# Patient Record
Sex: FEMALE | Race: WHITE | Employment: OTHER | URBAN - NONMETROPOLITAN AREA
[De-identification: names, ages, dates, MRNs, and addresses within clinical notes are randomized per-mention and may not be internally consistent; named-entity substitution may affect disease eponyms.]

---

## 2024-09-11 ENCOUNTER — APPOINTMENT (OUTPATIENT)
Dept: RADIOLOGY | Facility: MEDICAL CENTER | Age: 74
End: 2024-09-11
Payer: COMMERCIAL

## 2024-09-11 ENCOUNTER — OFFICE VISIT (OUTPATIENT)
Dept: URGENT CARE | Facility: MEDICAL CENTER | Age: 74
End: 2024-09-11
Payer: COMMERCIAL

## 2024-09-11 VITALS
OXYGEN SATURATION: 98 % | SYSTOLIC BLOOD PRESSURE: 118 MMHG | RESPIRATION RATE: 18 BRPM | TEMPERATURE: 97.8 F | DIASTOLIC BLOOD PRESSURE: 70 MMHG | WEIGHT: 128 LBS | HEART RATE: 68 BPM

## 2024-09-11 DIAGNOSIS — G89.29 CHRONIC RIGHT HIP PAIN: ICD-10-CM

## 2024-09-11 DIAGNOSIS — M54.31 RIGHT SIDED SCIATICA: Primary | ICD-10-CM

## 2024-09-11 DIAGNOSIS — M25.551 CHRONIC RIGHT HIP PAIN: ICD-10-CM

## 2024-09-11 PROCEDURE — S9083 URGENT CARE CENTER GLOBAL: HCPCS | Performed by: ORTHOPAEDIC SURGERY

## 2024-09-11 PROCEDURE — 73502 X-RAY EXAM HIP UNI 2-3 VIEWS: CPT

## 2024-09-11 PROCEDURE — 99213 OFFICE O/P EST LOW 20 MIN: CPT | Performed by: ORTHOPAEDIC SURGERY

## 2024-09-11 RX ORDER — PREDNISONE 10 MG/1
TABLET ORAL
Qty: 18 TABLET | Refills: 0 | Status: SHIPPED | OUTPATIENT
Start: 2024-09-11

## 2024-09-11 RX ORDER — ASPIRIN 81 MG/1
TABLET, COATED ORAL
COMMUNITY

## 2024-09-11 RX ORDER — INSULIN GLARGINE 100 [IU]/ML
INJECTION, SOLUTION SUBCUTANEOUS
COMMUNITY

## 2024-09-11 RX ORDER — MULTIVITAMIN
1 CAPSULE ORAL DAILY
COMMUNITY

## 2024-09-11 RX ORDER — LISINOPRIL 5 MG/1
5 TABLET ORAL DAILY
COMMUNITY
Start: 2024-08-23

## 2024-09-11 NOTE — PROGRESS NOTES
[facility]        NAME: Anay Anne is a 73 y.o. female  : 1950    MRN: [id number]  DATE: 2024  TIME: 1:58 PM    Assessment and Plan   Right sided sciatica [M54.31]  1. Right sided sciatica  predniSONE 10 mg tablet    Ambulatory Referral to Physical Therapy    Ambulatory referral to Spine & Pain Management      2. Chronic right hip pain  XR hip/pelv 2-3 vws right if performed        XR of the right hip reviewed, no evidence of fractures or dislocations. No bony lesions. No significant right hip degenerative changes.     History and exam consistent with sciatica. Pathophysiology of sciatica discussed with the patient, as well as management options available at this time.     Patient Instructions     Pain relief:  Take over the counter motrin or naproxen as directed on bottle  These are non-steroidal anti-inflammatories (NSAIDs). Ask your primary care provider before you take NSAIDs if you are on any blood thinners, or if you have a history of heart disease, kidney disease, gastric bypass surgery, GI bleed, or poorly controlled high blood pressure.    Acetaminophen (Tylenol) 1,000mg every 6-8 hours   Do not take more than 4,000mg of Tylenol a day  Over-the-counter lidocaine patches, Icyhot patches  Bengay   Compressive braces/wraps  Alternating cool compresses and heating pad  If prescribed, take prednisone as directed  Steroids are indicated for nerve inflammation (radiculopathy)  If prescribed, take Robaxin as directed  Robaxin is a muscle relaxer, indicated for muscle strains  Do not drive or operate heavy machinery while taken Robaxin as it may make you drowsy  Gentle home exercises/stretches  Script for PT provided  Follow up with chiropractic or Spine and Pain Management if your symptoms do not improve  Proceed to the ED if symptoms worsen    If tests are performed, our office will contact you with results only if changes need to made to the care plan discussed with you at  "the visit. You can review your full results on [facility].    Chief Complaint     Chief Complaint   Patient presents with    Hip Pain     Dev right hip pain  past 1 month . Had primary Dr german today, was sent   to  urgent Care to have x ray done.         History of Present Illness       73 YOF presents to the urgent care for evaluation of right leg pain. The patient is predominantly Sami speaking, though her daughter is in the room with her available for translation. The patient states she has been experiencing this pain for over a month. First she notes the pain starts anteriorly, radiating down the entire leg to the toes, though upon further questioning during exam she then admits the pain is along the buttock and posterior aspect of the leg radiating down. The pain is described as \"tight\" without numbness or tingling. She denies any injury or trauma. The pain is constant, but worsens with standing, lying down. She notes she has had similar pain in the past, though it has always resolved on its own without the need for medical intervention. The patient denies any back pain, denies any fevers or chills.         Review of Systems   Review of Systems   Constitutional:  Negative for chills and fever.   HENT:  Negative for ear pain and sore throat.    Eyes:  Negative for pain and visual disturbance.   Respiratory:  Negative for cough and shortness of breath.    Cardiovascular:  Negative for chest pain and palpitations.   Gastrointestinal:  Negative for abdominal pain and vomiting.   Genitourinary:  Negative for dysuria and hematuria.   Musculoskeletal:  Positive for arthralgias, gait problem and myalgias. Negative for back pain.   Skin:  Negative for color change and rash.   Neurological:  Negative for seizures and syncope.   All other systems reviewed and are negative.        Current Medications       Current Outpatient Medications:     Aspirin Low Dose 81 MG EC tablet, TOME 1 TABLETA POR VIA ORAL TODOS LOS " AHUJA FOR 90 DAYS, Disp: , Rfl:     Lantus SoloStar 100 units/mL SOPN, INJECT 12 UNITS ONCE A DAY, Disp: , Rfl:     lisinopril (ZESTRIL) 5 mg tablet, Take 5 mg by mouth daily, Disp: , Rfl:     metFORMIN (GLUCOPHAGE) 1000 MG tablet, Take 1,000 mg by mouth daily with breakfast, Disp: , Rfl:     Multiple Vitamin (multivitamin) capsule, Take 1 capsule by mouth daily, Disp: , Rfl:     predniSONE 10 mg tablet, 4 x 3 days, 3 x 1, 2 x 1, 1 x 1, Disp: 18 tablet, Rfl: 0    Current Allergies     Allergies as of 2024    (No Known Allergies)            The following portions of the patient's history were reviewed and updated as appropriate: allergies, current medications, past family history, past medical history, past social history, past surgical history and problem list.     Past Medical History:   Diagnosis Date    Diabetes mellitus (HCC)        Past Surgical History:   Procedure Laterality Date     SECTION         History reviewed. No pertinent family history.      Medications have been verified.        Objective   /70 (BP Location: Right arm, Patient Position: Sitting, Cuff Size: Standard)   Pulse 68   Temp 97.8 °F (36.6 °C) (Temporal)   Resp 18   Wt 58.1 kg (128 lb)   SpO2 98%        Physical Exam     Physical Exam  Vitals and nursing note reviewed.   Constitutional:       General: She is not in acute distress.     Appearance: Normal appearance. She is not ill-appearing.   HENT:      Head: Normocephalic and atraumatic.      Nose: Nose normal.      Mouth/Throat:      Mouth: Mucous membranes are moist.      Pharynx: Oropharynx is clear.   Eyes:      Extraocular Movements: Extraocular movements intact.      Pupils: Pupils are equal, round, and reactive to light.   Cardiovascular:      Rate and Rhythm: Normal rate and regular rhythm.      Pulses: Normal pulses.   Pulmonary:      Effort: Pulmonary effort is normal. No respiratory distress.   Musculoskeletal:      Cervical back: Normal range of motion.       Comments: The patient is able to stand from a seated position slowly, with pain. She ambulates on her own without assistive device, though with a painful limp, as weight bearing on the RLE causes pain. There is tenderness along the right buttock, sciatic notch. She also notes palpable tenderness along the right paraspinal musculature. No tenderness anteriorly or at the hip joint/GTB. The patient has full AROM of the ankle, knee, and hip. She is able to perform a SLR on the RLE, though does complain of pain in the right lower back and expresses difficulty performing a SLR on the LLE. 5/5 strength resisted knee flexion/extension, sitting hip flexion/abduction/adduction bilaterally. The lower extremities are neurovascularly intact.    Skin:     General: Skin is warm and dry.      Capillary Refill: Capillary refill takes less than 2 seconds.   Neurological:      General: No focal deficit present.      Mental Status: She is alert and oriented to person, place, and time.   Psychiatric:         Mood and Affect: Mood normal.         Behavior: Behavior normal.

## 2024-09-11 NOTE — PATIENT INSTRUCTIONS
Pain relief:  Take over the counter motrin or naproxen as directed on bottle  These are non-steroidal anti-inflammatories (NSAIDs). Ask your primary care provider before you take NSAIDs if you are on any blood thinners, or if you have a history of heart disease, kidney disease, gastric bypass surgery, GI bleed, or poorly controlled high blood pressure.    Acetaminophen (Tylenol) 1,000mg every 6-8 hours   Do not take more than 4,000mg of Tylenol a day  Over-the-counter lidocaine patches, Icyhot patches  Bengay   Compressive braces/wraps  Alternating cool compresses and heating pad  If prescribed, take prednisone as directed  Steroids are indicated for nerve inflammation (radiculopathy)  If prescribed, take Robaxin as directed  Robaxin is a muscle relaxer, indicated for muscle strains  Do not drive or operate heavy machinery while taken Robaxin as it may make you drowsy  Gentle home exercises/stretches  Script for PT provided  Follow up with chiropractic or Spine and Pain Management if your symptoms do not improve  Proceed to the ED if symptoms worsen

## 2024-09-23 ENCOUNTER — EVALUATION (OUTPATIENT)
Dept: PHYSICAL THERAPY | Facility: CLINIC | Age: 74
End: 2024-09-23
Payer: COMMERCIAL

## 2024-09-23 DIAGNOSIS — M25.551 RIGHT HIP PAIN: ICD-10-CM

## 2024-09-23 DIAGNOSIS — M54.32 BILATERAL SCIATICA: Primary | ICD-10-CM

## 2024-09-23 DIAGNOSIS — M54.31 BILATERAL SCIATICA: Primary | ICD-10-CM

## 2024-09-23 PROCEDURE — 97140 MANUAL THERAPY 1/> REGIONS: CPT | Performed by: PHYSICAL THERAPIST

## 2024-09-23 PROCEDURE — 97110 THERAPEUTIC EXERCISES: CPT | Performed by: PHYSICAL THERAPIST

## 2024-09-23 PROCEDURE — 97161 PT EVAL LOW COMPLEX 20 MIN: CPT | Performed by: PHYSICAL THERAPIST

## 2024-09-23 NOTE — LETTER
2024    Cullen Gonzalez MD  [address] [facility] [address]    Patient: Anay Anne   YOB: 1950   Date of Visit: 2024     Encounter Diagnosis     ICD-10-CM    1. Bilateral sciatica  M54.31     M54.32       2. Right hip pain  M25.551           Dear Dr. Gonzalez:    Thank you for your recent referral of Anay Anne. Please review the attached evaluation summary from Anay's recent visit.     Please verify that you agree with the plan of care by signing the attached order.     If you have any questions or concerns, please do not hesitate to call.     I sincerely appreciate the opportunity to share in the care of one of your patients and hope to have another opportunity to work with you in the near future.       Sincerely,    Williams Darling, PT      Referring Provider:      I certify that I have read the below Plan of Care and certify the need for these services furnished under this plan of treatment while under my care.                    Cullen Gonzalez MD  [address] [facility] [address]  Via Fax: [phone]          PT Evaluation     Today's date: 2024  Patient name: Anay Anne  : 1950  MRN: [id number]  Referring provider: Cullen Gonzalez MD  Dx:   Encounter Diagnosis     ICD-10-CM    1. Bilateral sciatica  M54.31     M54.32       2. Right hip pain  M25.551           Start Time: 0930  Stop Time: 1015  Total time in clinic (min): 45 minutes    Assessment  Impairments: abnormal gait, abnormal or restricted ROM, activity intolerance, impaired balance, impaired physical strength, lacks appropriate home exercise program, pain with function and activity limitations  Symptom irritability: moderate    Assessment details: Patient is a 72 y/o female with chief c/o R sided low back and R LE pain. Patient is here with her daughter who assisted in translation as necessary per patient's preference. Patient is presenting with R sided radicular symptoms with positive slump  "and crossed SLR tests. She has well preserved LE strength during resistance testing of LE myotomes. She noted no decrease in sensation to light touch to the B/L LE dermatomes. She denies any saddle anesthesia nor changes to bowel or bladder habits per her daughter. There was positive response to manual traction in a hook lying position with reports of decreased muscle spasm and radicular symptoms. She felt overall decreased pain post therapy interventions today and was instructed in proper posture and supported sitting along with activity modification to decrease time spent lying in bed. Patient was provided with a copy of her HEP with verbal understanding noted.   Understanding of Dx/Px/POC: good     Prognosis: good    Goals  STGs:  \"Patient will be independent with hep by 2-3 visits.   Decrease low back pain by 25% for improved tolerance with adls and home duties by 3-4 weeks.   Improve Lumbar rom to wfl for improved tolerance with adls and home duties by 3-4 weeks. \"    LTGs:  \"Improve FOTO score from 12 to 49 indicating improved tolerance with activities involving the low back by discharge.   Patient will demonstrate rom and strength to the lumbar spine wfl for improved tolerance with adls and home duties by discharge.   Patient will be free of radicular symptoms by discharge. \"      Plan  Patient would benefit from: skilled physical therapy  Planned modality interventions: cryotherapy and thermotherapy: hydrocollator packs    Planned therapy interventions: abdominal trunk stabilization, ADL retraining, body mechanics training, flexibility, functional ROM exercises, home exercise program, therapeutic exercise, therapeutic activities, stretching, strengthening, postural training, patient education, joint mobilization and manual therapy    Frequency: 2x week  Duration in weeks: 6  Plan of Care beginning date: 9/23/2024  Plan of Care expiration date: 11/4/2024  Treatment plan discussed with: patient  Plan details: " Patient informed that from this point forward, to ensure adherence to the aforementioned plan of care, all or some of the treatment may be performed and carried out by a Physical Therapy Assistant (PTA) with supervision from a licensed Physical Therapist (PT) in accordance with [facility] Physical Therapy Practice Act.  Patient will continue to benefit from skilled physical therapy to address the functional deficits that were identified during the evaluation today. We will continue to progress the therapy program to address these functional deficits and achieve the established goals.                Subjective Evaluation    History of Present Illness  Mechanism of injury: Patient presents to out patient physical therapy with chief c/o low back pain. Patient reports a history of insidious onset of symptoms over the past 3 months.           Not a recurrent problem   Quality of life: good    Patient Goals  Patient goals for therapy: decreased pain, increased motion, increased strength, independence with ADLs/IADLs and return to sport/leisure activities    Pain  Current pain ratin  At best pain ratin  At worst pain rating: 10  Location: Lumbar  Relieving factors: medications (R knee to chest while lying down)  Aggravating factors: sitting, lifting, walking and standing    Social Support  Lives in: multiple-level home  Lives with: adult children    Employment status: not working        Objective     Tenderness     Lumbar Spine  No tenderness in the spinous process, facet joint, left transverse process and right transverse process.     Left Hip   Tenderness in the PSIS.     Right Hip   Tenderness in the PSIS.     Neurological Testing     Sensation     Lumbar   Left   Intact: light touch    Right   Intact: light touch    Reflexes   Left   Patellar (L4): normal (2+)  Achilles (S1): normal (2+)  Clonus sign: negative    Right   Patellar (L4): normal (2+)  Achilles (S1): normal (2+)  Clonus sign:  "negative    Active Range of Motion     Lumbar   Flexion:  with pain Restriction level: minimal  Extension:  with pain Restriction level: moderate  Left lateral flexion:  with pain Restriction level: moderate  Right lateral flexion:  with pain Restriction level: moderate  Left rotation:  with pain Restriction level: minimal  Right rotation:  with pain Restriction level: minimal    Strength/Myotome Testing     Left Hip   Planes of Motion   Flexion: 4    Right Hip   Planes of Motion   Flexion: 4    Left Knee   Flexion: 4  Extension: 4    Right Knee   Flexion: 4  Extension: 4    Left Ankle/Foot   Dorsiflexion: 4  Plantar flexion: 4  Great toe extension: 4    Right Ankle/Foot   Dorsiflexion: 4  Plantar flexion: 4  Great toe extension: 4    Tests     Lumbar     Left   Positive crossed SLR.   Negative slump test.     Right   Positive slump test.   Negative crossed SLR.     Ambulation     Observational Gait   Gait: antalgic and crouched   Increased left stance time. Decreased right stance time.       Flowsheet Rows      Flowsheet Row Most Recent Value   PT/OT G-Codes    Current Score 12   Projected Score 49               Precautions: None      Date 9/23 IE       FOTO 12 SC       Manuals        Hook lying manual lumbar traction 10 min                               Neuro Re-Ed                                                                Ther Ex        LTR 5\" 10x       Posture education/transfer training 10 min                                                       Ther Activity                        Gait Training                        Modalities                                               "

## 2024-09-23 NOTE — PROGRESS NOTES
"PT Evaluation     Today's date: 2024  Patient name: Anay Anne  : 1950  MRN: [id number]  Referring provider: Cullen Gonzalez MD  Dx:   Encounter Diagnosis     ICD-10-CM    1. Bilateral sciatica  M54.31     M54.32       2. Right hip pain  M25.551           Start Time: 0930  Stop Time: 1015  Total time in clinic (min): 45 minutes    Assessment  Impairments: abnormal gait, abnormal or restricted ROM, activity intolerance, impaired balance, impaired physical strength, lacks appropriate home exercise program, pain with function and activity limitations  Symptom irritability: moderate    Assessment details: Patient is a 72 y/o female with chief c/o R sided low back and R LE pain. Patient is here with her daughter who assisted in translation as necessary per patient's preference. Patient is presenting with R sided radicular symptoms with positive slump and crossed SLR tests. She has well preserved LE strength during resistance testing of LE myotomes. She noted no decrease in sensation to light touch to the B/L LE dermatomes. She denies any saddle anesthesia nor changes to bowel or bladder habits per her daughter. There was positive response to manual traction in a hook lying position with reports of decreased muscle spasm and radicular symptoms. She felt overall decreased pain post therapy interventions today and was instructed in proper posture and supported sitting along with activity modification to decrease time spent lying in bed. Patient was provided with a copy of her HEP with verbal understanding noted.   Understanding of Dx/Px/POC: good     Prognosis: good    Goals  STGs:  \"Patient will be independent with hep by 2-3 visits.   Decrease low back pain by 25% for improved tolerance with adls and home duties by 3-4 weeks.   Improve Lumbar rom to wfl for improved tolerance with adls and home duties by 3-4 weeks. \"    LTGs:  \"Improve FOTO score from 12 to 49 indicating improved tolerance with activities " "involving the low back by discharge.   Patient will demonstrate rom and strength to the lumbar spine wfl for improved tolerance with adls and home duties by discharge.   Patient will be free of radicular symptoms by discharge. \"      Plan  Patient would benefit from: skilled physical therapy  Planned modality interventions: cryotherapy and thermotherapy: hydrocollator packs    Planned therapy interventions: abdominal trunk stabilization, ADL retraining, body mechanics training, flexibility, functional ROM exercises, home exercise program, therapeutic exercise, therapeutic activities, stretching, strengthening, postural training, patient education, joint mobilization and manual therapy    Frequency: 2x week  Duration in weeks: 6  Plan of Care beginning date: 9/23/2024  Plan of Care expiration date: 11/4/2024  Treatment plan discussed with: patient  Plan details: Patient informed that from this point forward, to ensure adherence to the aforementioned plan of care, all or some of the treatment may be performed and carried out by a Physical Therapy Assistant (PTA) with supervision from a licensed Physical Therapist (PT) in accordance with [facility] Physical Therapy Practice Act.  Patient will continue to benefit from skilled physical therapy to address the functional deficits that were identified during the evaluation today. We will continue to progress the therapy program to address these functional deficits and achieve the established goals.                Subjective Evaluation    History of Present Illness  Mechanism of injury: Patient presents to out patient physical therapy with chief c/o low back pain. Patient reports a history of insidious onset of symptoms over the past 3 months.           Not a recurrent problem   Quality of life: good    Patient Goals  Patient goals for therapy: decreased pain, increased motion, increased strength, independence with ADLs/IADLs and return to sport/leisure " activities    Pain  Current pain ratin  At best pain ratin  At worst pain rating: 10  Location: Lumbar  Relieving factors: medications (R knee to chest while lying down)  Aggravating factors: sitting, lifting, walking and standing    Social Support  Lives in: multiple-level home  Lives with: adult children    Employment status: not working        Objective     Tenderness     Lumbar Spine  No tenderness in the spinous process, facet joint, left transverse process and right transverse process.     Left Hip   Tenderness in the PSIS.     Right Hip   Tenderness in the PSIS.     Neurological Testing     Sensation     Lumbar   Left   Intact: light touch    Right   Intact: light touch    Reflexes   Left   Patellar (L4): normal (2+)  Achilles (S1): normal (2+)  Clonus sign: negative    Right   Patellar (L4): normal (2+)  Achilles (S1): normal (2+)  Clonus sign: negative    Active Range of Motion     Lumbar   Flexion:  with pain Restriction level: minimal  Extension:  with pain Restriction level: moderate  Left lateral flexion:  with pain Restriction level: moderate  Right lateral flexion:  with pain Restriction level: moderate  Left rotation:  with pain Restriction level: minimal  Right rotation:  with pain Restriction level: minimal    Strength/Myotome Testing     Left Hip   Planes of Motion   Flexion: 4    Right Hip   Planes of Motion   Flexion: 4    Left Knee   Flexion: 4  Extension: 4    Right Knee   Flexion: 4  Extension: 4    Left Ankle/Foot   Dorsiflexion: 4  Plantar flexion: 4  Great toe extension: 4    Right Ankle/Foot   Dorsiflexion: 4  Plantar flexion: 4  Great toe extension: 4    Tests     Lumbar     Left   Positive crossed SLR.   Negative slump test.     Right   Positive slump test.   Negative crossed SLR.     Ambulation     Observational Gait   Gait: antalgic and crouched   Increased left stance time. Decreased right stance time.       Flowsheet Rows      Flowsheet Row Most Recent Value   PT/OT G-Codes   "  Current Score 12   Projected Score 49               Precautions: None      Date 9/23 IE       FOTO 12 SC       Manuals        Hook lying manual lumbar traction 10 min                               Neuro Re-Ed                                                                Ther Ex        LTR 5\" 10x       Posture education/transfer training 10 min                                                       Ther Activity                        Gait Training                        Modalities                               "

## 2024-09-24 ENCOUNTER — OFFICE VISIT (OUTPATIENT)
Dept: PHYSICAL THERAPY | Facility: CLINIC | Age: 74
End: 2024-09-24
Payer: COMMERCIAL

## 2024-09-24 DIAGNOSIS — M25.551 RIGHT HIP PAIN: ICD-10-CM

## 2024-09-24 DIAGNOSIS — M54.31 BILATERAL SCIATICA: Primary | ICD-10-CM

## 2024-09-24 DIAGNOSIS — M54.32 BILATERAL SCIATICA: Primary | ICD-10-CM

## 2024-09-24 PROCEDURE — 97110 THERAPEUTIC EXERCISES: CPT | Performed by: PHYSICAL THERAPIST

## 2024-09-24 PROCEDURE — 97530 THERAPEUTIC ACTIVITIES: CPT | Performed by: PHYSICAL THERAPIST

## 2024-09-24 NOTE — PROGRESS NOTES
"Daily Note     Today's date: 2024  Patient name: Anay Anne  : 1950  MRN: [id number]  Referring provider: Cullen Gonzalez MD  Dx:   Encounter Diagnosis     ICD-10-CM    1. Bilateral sciatica  M54.31     M54.32       2. Right hip pain  M25.551           Start Time: 0930  Stop Time: 1024  Total time in clinic (min): 54 minutes    Subjective: Patient reports slight improvements with symptoms into the R LE since her initial visit. She continues to report difficulties with prolonged sit to stand and notes that symptoms will travel to the R foot but are not constantly there. She finds relief by lying down.       Objective: See treatment diary below      Assessment: Tolerated treatment well. Patient demonstrated fatigue post treatment, exhibited good technique with therapeutic exercises, and would benefit from continued PT Patient responded well to sustained prone extension with centralization of symptoms to the thigh. There was increased discomfort to the R LE with bridges and she also noted increased discomfort during prone press ups which were held due to increased pain. Patient educated on prone propping for HEP today.       Plan: Continue per plan of care.  Progress treatment as tolerated.       Precautions: None      Date  IE       FOTO 12 SC       Manuals        Hook lying manual lumbar traction 10 min NT                              Neuro Re-Ed                                                                Ther Ex        LTR 5\" 10x 5\" 10x      Posture education/transfer training 10 min       Prone lying  3' x2 no pillow 3' x1 1 pillow      Prone press up  15x Hold P!      NuStep for mobility and strengthening  L6 5 min                              Ther Activity        Bridges  5\" 15x      PPT  5\" 15x      Gait Training                        Modalities                               "

## 2024-09-30 ENCOUNTER — OFFICE VISIT (OUTPATIENT)
Dept: PHYSICAL THERAPY | Facility: CLINIC | Age: 74
End: 2024-09-30
Payer: COMMERCIAL

## 2024-09-30 DIAGNOSIS — M25.551 RIGHT HIP PAIN: ICD-10-CM

## 2024-09-30 DIAGNOSIS — M54.32 BILATERAL SCIATICA: Primary | ICD-10-CM

## 2024-09-30 DIAGNOSIS — M54.31 BILATERAL SCIATICA: Primary | ICD-10-CM

## 2024-09-30 PROCEDURE — 97530 THERAPEUTIC ACTIVITIES: CPT | Performed by: PHYSICAL THERAPIST

## 2024-09-30 PROCEDURE — 97140 MANUAL THERAPY 1/> REGIONS: CPT | Performed by: PHYSICAL THERAPIST

## 2024-09-30 PROCEDURE — 97110 THERAPEUTIC EXERCISES: CPT | Performed by: PHYSICAL THERAPIST

## 2024-09-30 NOTE — PROGRESS NOTES
"Daily Note     Today's date: 2024  Patient name: Anay Anne  : 1950  MRN: [id number]  Referring provider: Cullen Gonzalez MD  Dx:   Encounter Diagnosis     ICD-10-CM    1. Bilateral sciatica  M54.31     M54.32       2. Right hip pain  M25.551           Start Time: 1145  Stop Time: 1230  Total time in clinic (min): 45 minutes    Subjective: Patient reports increased R LE pain since the end of last week. She noted mild improvements with her HEP but noted continuation of symptoms throughout the day over the weekend. She reports difficulties with sleep secondary to increased pain to the R LE and hip.       Objective: See treatment diary below      Assessment: Tolerated treatment fair. Patient  Patient to contact her physician regarding continued elevated pain levels.   Patient reported a decrease in pain during hook lying manual traction but upon standing and walking noted a return of symptoms and increased R LE pain. Patient had better tolerance for mobility of the lumbar spine in supine compared to standing.       Plan: Progress treatment as tolerated.       Precautions: None      Date  IE      FOTO 12 SC       Manuals        Hook lying manual lumbar traction 10 min NT 10 min                             Neuro Re-Ed                                                                Ther Ex        LTR 5\" 10x 5\" 10x 5\" 15x     Posture education/transfer training 10 min       Prone lying  3' x2 no pillow 3' x1 1 pillow NT     Prone press up  15x Hold P! NT     NuStep for mobility and strengthening  L6 5 min L4 7 min     SKTC   5\" 10x ea.                      Ther Activity        Bridges  5\" 15x NT     PPT  5\" 15x 5\" 15x     Gait Training                        Modalities                                 "

## 2024-10-03 RX ORDER — BLOOD SUGAR DIAGNOSTIC
1 STRIP MISCELLANEOUS DAILY
COMMUNITY
Start: 2024-09-04

## 2024-10-03 RX ORDER — TERBINAFINE HYDROCHLORIDE 250 MG/1
250 TABLET ORAL
COMMUNITY
End: 2024-10-07

## 2024-10-03 RX ORDER — GLIPIZIDE 10 MG/1
TABLET, FILM COATED, EXTENDED RELEASE ORAL
COMMUNITY
End: 2024-10-07

## 2024-10-03 RX ORDER — BLOOD-GLUCOSE METER
KIT MISCELLANEOUS
COMMUNITY

## 2024-10-03 RX ORDER — LANCETS 33 GAUGE
EACH MISCELLANEOUS
COMMUNITY

## 2024-10-03 RX ORDER — ICOSAPENT ETHYL 0.5 G/1
CAPSULE ORAL
COMMUNITY
End: 2024-10-07

## 2024-10-03 RX ORDER — ATORVASTATIN CALCIUM 20 MG/1
20 TABLET, FILM COATED ORAL DAILY
COMMUNITY
Start: 2024-08-23

## 2024-10-03 RX ORDER — LINAGLIPTIN 5 MG/1
5 TABLET, FILM COATED ORAL
COMMUNITY
End: 2024-10-07

## 2024-10-03 RX ORDER — FLURBIPROFEN SODIUM 0.3 MG/ML
SOLUTION/ DROPS OPHTHALMIC DAILY
COMMUNITY
Start: 2024-09-04

## 2024-10-07 ENCOUNTER — APPOINTMENT (OUTPATIENT)
Dept: RADIOLOGY | Facility: MEDICAL CENTER | Age: 74
End: 2024-10-07
Payer: COMMERCIAL

## 2024-10-07 ENCOUNTER — CONSULT (OUTPATIENT)
Dept: PAIN MEDICINE | Facility: CLINIC | Age: 74
End: 2024-10-07
Payer: COMMERCIAL

## 2024-10-07 VITALS
WEIGHT: 129 LBS | RESPIRATION RATE: 16 BRPM | SYSTOLIC BLOOD PRESSURE: 151 MMHG | BODY MASS INDEX: 23.74 KG/M2 | OXYGEN SATURATION: 97 % | TEMPERATURE: 97.7 F | DIASTOLIC BLOOD PRESSURE: 70 MMHG | HEIGHT: 62 IN | HEART RATE: 70 BPM

## 2024-10-07 DIAGNOSIS — M51.16 LUMBAR DISC DISEASE WITH RADICULOPATHY: Primary | ICD-10-CM

## 2024-10-07 DIAGNOSIS — R29.898 RIGHT LEG WEAKNESS: ICD-10-CM

## 2024-10-07 DIAGNOSIS — M54.31 RIGHT SIDED SCIATICA: ICD-10-CM

## 2024-10-07 DIAGNOSIS — G89.4 CHRONIC PAIN SYNDROME: ICD-10-CM

## 2024-10-07 DIAGNOSIS — M51.16 LUMBAR DISC DISEASE WITH RADICULOPATHY: ICD-10-CM

## 2024-10-07 PROCEDURE — 72114 X-RAY EXAM L-S SPINE BENDING: CPT

## 2024-10-07 PROCEDURE — 99204 OFFICE O/P NEW MOD 45 MIN: CPT | Performed by: ANESTHESIOLOGY

## 2024-10-07 PROCEDURE — G2211 COMPLEX E/M VISIT ADD ON: HCPCS | Performed by: ANESTHESIOLOGY

## 2024-10-07 RX ORDER — DULOXETIN HYDROCHLORIDE 20 MG/1
20 CAPSULE, DELAYED RELEASE ORAL DAILY
Qty: 30 CAPSULE | Refills: 1 | Status: SHIPPED | OUTPATIENT
Start: 2024-10-07 | End: 2024-11-06

## 2024-10-07 NOTE — PROGRESS NOTES
Assessment:  1. Lumbar disc disease with radiculopathy    2. Right sided sciatica    3. Right leg weakness    4. Chronic pain syndrome        Plan:  Patient is a 73-year-old female with complaints of low back pain and severe right leg pain with chronic pain some secondary to lumbar radiculopathy presents to office for initial consultation.  Patient reported severe right leg pain and weakness.  Patient reports specially at nighttime and in the morning she has excruciating sharp, shooting pains and pins-and-needles in the right leg which appear to be in the L4 and L5 nerve root distribution.  Patient has done physical therapy without any significant alleviation symptoms.  At today's visit there is single surgeon for the weakness in patient's right leg.  1.  We will obtain an x-ray of the lumbar spine to better assess the degenerative change and correlate patient current presentation.  2.  We will order a MRI of the lumbar spine to better assess the discogenic pathology by patient's right lower extremity Dickler symptoms  With 3 we will trial Cymbalta 30 mg p.o. nightly  4.  Follow-up in 3 weeks to review MRI and plan interventional management    History of Present Illness:    The patient is a 73 y.o. female who presents for consultation in regards to Back Pain.  Symptoms have been present for 3 months. Symptoms began without any precipitating injury or trauma. Pain is reported to be 10 on the numeric rating scale.  Symptoms are felt constantly and worst in the morning.  Symptoms are characterized as shooting, dull/aching, numbing, tingling, and throbbing.  Symptoms are associated with right leg weakness.  Aggravating factors include standing, bending, leaning forward, leaning bckward, sitting, walking, and coughing/sneezing.  Relieving factors include lying down, turning the head, and relaxation.  No change in symptoms with kneeling, coughing/sneezing, and bowel movements.  Treatments that have been helpful include  "nothing. physical therapy, home exercise, and heat/ice have provided no relief.  Medications to relieve symptoms include ibuprofen.    Review of Systems:    Review of Systems   Musculoskeletal:  Positive for back pain and gait problem.   All other systems reviewed and are negative.          Past Medical History:   Diagnosis Date    Diabetes mellitus (HCC)        Past Surgical History:   Procedure Laterality Date     SECTION         History reviewed. No pertinent family history.    Social History     Occupational History    Not on file   Tobacco Use    Smoking status: Never     Passive exposure: Never    Smokeless tobacco: Never   Vaping Use    Vaping status: Never Used   Substance and Sexual Activity    Alcohol use: Never    Drug use: Never    Sexual activity: Not Currently         Current Outpatient Medications:     Aspirin Low Dose 81 MG EC tablet, TOME 1 TABLETA POR VIA ORAL TODOS LOS AHUJA FOR 90 DAYS, Disp: , Rfl:     atorvastatin (LIPITOR) 20 mg tablet, Take 20 mg by mouth daily, Disp: , Rfl:     B-D UF III MINI PEN NEEDLES 31G X 5 MM MISC, daily, Disp: , Rfl:     Blood Glucose Monitoring Suppl (Assure Pro Blood Glucose Meter) MICHAEL, OneTouch Ultra2 Meter, Disp: , Rfl:     Insulin Syringe-Needle U-100 25G X 1\" 1 ML MISC, BD Insulin Syringe Ultra-Fine 1 mL 31 gauge x 5/16\"  USE TO INJECT INSULIN ONCE DAILY AS DIRECTED, Disp: , Rfl:     Lancets 33G MISC, OneTouch Delica Plus Lancet 33 gauge  USE THREE TIMES A DAY TO CHECK SUGARS, Disp: , Rfl:     Lantus SoloStar 100 units/mL SOPN, INJECT 12 UNITS ONCE A DAY, Disp: , Rfl:     lisinopril (ZESTRIL) 5 mg tablet, Take 5 mg by mouth daily, Disp: , Rfl:     metFORMIN (GLUCOPHAGE) 1000 MG tablet, Take 1,000 mg by mouth daily with breakfast, Disp: , Rfl:     Multiple Vitamin (multivitamin) capsule, Take 1 capsule by mouth daily, Disp: , Rfl:     OneTouch Ultra test strip, Use 1 each daily Test, Disp: , Rfl:     predniSONE 10 mg tablet, 4 x 3 days, 3 x 1, 2 x 1, 1 x " "1, Disp: 18 tablet, Rfl: 0    No Known Allergies    Physical Exam:    /70   Pulse 70   Temp 97.7 °F (36.5 °C)   Resp 16   Ht 5' 2\" (1.575 m)   Wt 58.5 kg (129 lb)   SpO2 97%   BMI 23.59 kg/m²     Constitutional: normal, well developed, well nourished, alert, in no distress and non-toxic and no overt pain behavior.  Eyes: anicteric  HEENT: grossly intact  Neck: supple, symmetric, trachea midline and no masses   Pulmonary:even and unlabored  Cardiovascular:No edema or pitting edema present  Skin:Normal without rashes or lesions and well hydrated  Psychiatric:Mood and affect appropriate  Neurologic:Cranial Nerves II-XII grossly intact  Musculoskeletal:antalgic    Lumbar/Sacral Spine examination demonstrates.  Full range of motion lumbar spine with pain upon: flexion, lateral rotation to the left/right, and bending to the left/right.  Bilateral lumbar paraspinals tender to palpation. Muscle spasms noted in the lumbar area bilaterally. 3/5 right lower extremity strength in all muscle groups bilaterally. Positive seated straight leg raise for bilateral lower extremities.  Sensitivity to light touch intact bilateral lower extremities. 2+ reflexes in the patella and Achilles.  No ankle clonus    Imaging            No orders to display       No orders of the defined types were placed in this encounter.     "

## 2024-10-09 ENCOUNTER — OFFICE VISIT (OUTPATIENT)
Dept: OBGYN CLINIC | Facility: CLINIC | Age: 74
End: 2024-10-09
Payer: COMMERCIAL

## 2024-10-09 VITALS
TEMPERATURE: 98 F | DIASTOLIC BLOOD PRESSURE: 79 MMHG | OXYGEN SATURATION: 97 % | HEART RATE: 71 BPM | SYSTOLIC BLOOD PRESSURE: 136 MMHG | HEIGHT: 62 IN | RESPIRATION RATE: 16 BRPM | WEIGHT: 126.6 LBS | BODY MASS INDEX: 23.3 KG/M2

## 2024-10-09 DIAGNOSIS — M54.31 RIGHT SIDED SCIATICA: Primary | ICD-10-CM

## 2024-10-09 DIAGNOSIS — M25.551 PAIN IN RIGHT HIP: ICD-10-CM

## 2024-10-09 PROCEDURE — 99203 OFFICE O/P NEW LOW 30 MIN: CPT | Performed by: STUDENT IN AN ORGANIZED HEALTH CARE EDUCATION/TRAINING PROGRAM

## 2024-10-09 NOTE — PROGRESS NOTES
"Assessment & Plan  Pain in right hip  Discussed that pain is likely source from lumbar spine  Recommend continued follow up with spine and pain  Patient is to follow up as needed  Discussed that can use heat to lower back as needed         Right sided sciatica             Chief Complaint   Patient presents with    Right Leg - Pain        Subjective    Anay Anne is a 73 y.o. female who presents with right hip pain:         History of Present Illness   Hip pain started 3 months ago.  The pain is 7/10. The pain is located in the lower back and shoots down her glutes to her toes. The pain is described as sharp shooting pain. They have tried NSAIDS for their problem without relief.Pain is constant. Pain worsens with sitting for long periods of time, standing, walking, she notes that can't get comfortable and is not able to sleep well at night.    The pain does shoot down into the foot. The patient does not have numbness and/or tingling in the foot. The hip does not  pop. The patient does not  have pain with coughing or sneezing. The patient does not  have bowel or bladder problems.    [facility] Patient Answers  Failed to redirect to the Timeline version of the [employer] SmartLink.    No Known Allergies  Outpatient Encounter Medications as of 10/9/2024   Medication Sig Dispense Refill    Aspirin Low Dose 81 MG EC tablet TOME 1 TABLETA POR VIA ORAL TODOS LOS AHUJA FOR 90 DAYS      atorvastatin (LIPITOR) 20 mg tablet Take 20 mg by mouth daily      B-D UF III MINI PEN NEEDLES 31G X 5 MM MISC daily      Blood Glucose Monitoring Suppl (Assure Pro Blood Glucose Meter) MICHAEL OneTouch Ultra2 Meter      DULoxetine (CYMBALTA) 20 mg capsule Take 1 capsule (20 mg total) by mouth daily 30 capsule 1    ferrous fumarate-b12-vitamic C-folic acid (FOLTRIN) capsule Take 1 capsule by mouth daily before breakfast      Insulin Syringe-Needle U-100 25G X 1\" 1 ML MISC BD Insulin Syringe Ultra-Fine 1 mL 31 gauge x 5/16\"   USE TO INJECT " INSULIN ONCE DAILY AS DIRECTED      Lancets 33G MISC OneTouch Delica Plus Lancet 33 gauge   USE THREE TIMES A DAY TO CHECK SUGARS      Lantus SoloStar 100 units/mL SOPN INJECT 12 UNITS ONCE A DAY      lisinopril (ZESTRIL) 5 mg tablet Take 5 mg by mouth daily      metFORMIN (GLUCOPHAGE) 1000 MG tablet Take 1,000 mg by mouth daily with breakfast      Multiple Vitamin (multivitamin) capsule Take 1 capsule by mouth daily      OneTouch Ultra test strip Use 1 each daily Test      predniSONE 10 mg tablet 4 x 3 days, 3 x 1, 2 x 1, 1 x 1 (Patient not taking: Reported on 10/7/2024) 18 tablet 0     No facility-administered encounter medications on file as of 10/9/2024.     Past Medical History:   Diagnosis Date    Diabetes mellitus (HCC)        Past Surgical History:   Procedure Laterality Date     SECTION       History reviewed. No pertinent family history.    Social History     Tobacco Use    Smoking status: Never     Passive exposure: Never    Smokeless tobacco: Never   Vaping Use    Vaping status: Never Used   Substance Use Topics    Alcohol use: Never    Drug use: Never           Objective    [unfilled]  [unfilled]  Body mass index is 23.16 kg/m².  Physical Exam  Constitutional:       Appearance: Normal appearance.   Neurological:      General: No focal deficit present.      Mental Status: She is alert.   Psychiatric:         Mood and Affect: Mood normal.         Behavior: Behavior normal.       Hip/Spine Exam  Side: right   Gait: Normal   Tenderness: NTTP       Hip range of motion   Flexion Extension IR ER   Left       Right 0-90 0-30 0-45 0-45   Hip strength   Flexion Extension Abduction Adduction   Left       Right 5/5 5/5 5/5    Provocative Tests:  Flexion/Internal rotation (FADIR) test for impingement: NEGATIVE   Scour test NEGATIVE   KETAN test: NEGATIVE   Stinchfield test NEGATIVE   Log roll test NEGATIVE   Cassandra's test NEGATIVE   Straight leg raise: POSITIVE   Distally the patient's neurovascular status is  normal.    IMAGING:  I reviewed and interpreted multiple x-rays of the right hip taken, including AP, Anderson lateral reveals mild degenerative changes without acute osseous abnormality.           Follow Up: Return if symptoms worsen or fail to improve.    All questions answered and patient agrees with plan.    I have spent a total time of 20 minutes in caring for this patient on the day of the visit/encounter including Diagnostic results, Patient and family education, and Impressions.

## 2024-10-22 ENCOUNTER — HOSPITAL ENCOUNTER (OUTPATIENT)
Dept: MRI IMAGING | Facility: HOSPITAL | Age: 74
Discharge: HOME | End: 2024-10-22
Attending: ANESTHESIOLOGY
Payer: COMMERCIAL

## 2024-10-22 DIAGNOSIS — M54.31 RIGHT SIDED SCIATICA: ICD-10-CM

## 2024-10-22 DIAGNOSIS — G89.4 CHRONIC PAIN SYNDROME: ICD-10-CM

## 2024-10-22 DIAGNOSIS — R29.898 RIGHT LEG WEAKNESS: ICD-10-CM

## 2024-10-22 DIAGNOSIS — M51.16 LUMBAR DISC DISEASE WITH RADICULOPATHY: ICD-10-CM

## 2024-10-22 PROCEDURE — 72148 MRI LUMBAR SPINE W/O DYE: CPT

## 2024-10-31 DIAGNOSIS — M51.16 LUMBAR DISC DISEASE WITH RADICULOPATHY: ICD-10-CM

## 2024-10-31 DIAGNOSIS — M54.31 RIGHT SIDED SCIATICA: ICD-10-CM

## 2024-10-31 DIAGNOSIS — R29.898 RIGHT LEG WEAKNESS: ICD-10-CM

## 2024-10-31 DIAGNOSIS — G89.4 CHRONIC PAIN SYNDROME: ICD-10-CM

## 2024-10-31 RX ORDER — DULOXETIN HYDROCHLORIDE 20 MG/1
20 CAPSULE, DELAYED RELEASE ORAL DAILY
Qty: 90 CAPSULE | Refills: 1 | Status: SHIPPED | OUTPATIENT
Start: 2024-10-31

## 2024-11-15 ENCOUNTER — DOCUMENTATION (OUTPATIENT)
Dept: PAIN MEDICINE | Facility: CLINIC | Age: 74
End: 2024-11-15

## 2024-11-15 NOTE — PROGRESS NOTES
Patient scheduled for follow up appt to discuss MRI results on Monday 11/18/24. Patient was scheduled in 15 min slot. Patient is Thai speaking and per office guidelines requires a 30 min appt. I called patient using [id number]  Santa # [phone] who left patient a detailed message stating that appt will be cancelled and rescheduled. Advised to call office for reschedule

## 2024-11-18 ENCOUNTER — OFFICE VISIT (OUTPATIENT)
Dept: PAIN MEDICINE | Facility: CLINIC | Age: 74
End: 2024-11-18
Payer: COMMERCIAL

## 2024-11-18 VITALS
TEMPERATURE: 97.3 F | SYSTOLIC BLOOD PRESSURE: 162 MMHG | HEART RATE: 71 BPM | BODY MASS INDEX: 23.41 KG/M2 | HEIGHT: 62 IN | RESPIRATION RATE: 16 BRPM | OXYGEN SATURATION: 95 % | WEIGHT: 127.2 LBS | DIASTOLIC BLOOD PRESSURE: 83 MMHG

## 2024-11-18 DIAGNOSIS — M54.31 RIGHT SIDED SCIATICA: ICD-10-CM

## 2024-11-18 DIAGNOSIS — R29.898 RIGHT LEG WEAKNESS: ICD-10-CM

## 2024-11-18 DIAGNOSIS — E11.9 DM TYPE 2, NOT AT GOAL (HCC): ICD-10-CM

## 2024-11-18 DIAGNOSIS — G89.4 CHRONIC PAIN SYNDROME: ICD-10-CM

## 2024-11-18 DIAGNOSIS — M51.16 LUMBAR DISC DISEASE WITH RADICULOPATHY: Primary | ICD-10-CM

## 2024-11-18 PROCEDURE — G2211 COMPLEX E/M VISIT ADD ON: HCPCS | Performed by: ANESTHESIOLOGY

## 2024-11-18 PROCEDURE — 99214 OFFICE O/P EST MOD 30 MIN: CPT | Performed by: ANESTHESIOLOGY

## 2024-11-18 NOTE — PATIENT INSTRUCTIONS
Patient Education     Core Strengthening Exercises on Back or on Hands and Knees   About this topic   Your core muscles are in your chest, back, buttock, and stomach area. They are your abdominal, back, and pelvis muscles. These muscles help keep your body stable when using your arms or legs. They also help with balance and posture. There are many exercises you can do to keep these muscles strong.  If you have back problems like a compression fracture or a ruptured disc, doing some of these exercises could make your problem worse. Some of these exercises may cause lower back pain.  General   Before starting with a program, ask your doctor if you are healthy enough to do these exercises. Your doctor may have you work with a , chiropractor, or physical therapist to make a safe exercise program to meet your needs.  Strengthening Exercises   Strengthening exercises keep your muscles firm and strong. Start by repeating each exercise 2 to 3 times. Work up to doing each exercise 10 times. Try to do the exercises 2 to 3 times each day. Hold each exercise for 3 to 5 seconds. Do all exercises slowly.  Hip lifts ? Lie on your back with your knees bent and feet flat on the floor. Tighten your stomach muscles and push your heels into the floor to lift your buttocks off the floor. Relax.  Pelvic tilts ? Lie on your back with your knees bent and feet flat on the floor. Tighten your stomach muscles and press your lower back down to the floor. Relax.  Straight leg raises lying down ? Lie on your back with one leg straight. Bend your other knee so the foot is flat on the bed. Keeping your leg straight, lift the leg up to the level of your other knee. Lower it back down. Repeat with the other leg.  Knee flex lying down ? Lie on your back with both knees bent and your feet flat on the floor. Tighten your belly muscles. Raise one leg up and back down as if you are marching in slow motion. Keep belly muscles tight while you move  your leg. Switch legs. To make this exercise harder, raise both arms straight up in the air. Tighten your belly muscles. When you raise one leg up, reach the opposite arm over your head. Switch, moving the opposite arm and leg until you have done 10 repetitions on each side.  Abdominal crunches ? Lie on your back with both knees bent. Keep your feet flat on the floor. Place your hands in one of these positions. Try starting with the first position since it is the easiest. As you get better, use the other positions to make it harder.  Crunches with arms at sides.  Crunches with arms across chest.  Crunches with arms behind head. Be careful not to interlock your fingers behind your neck or head while doing crunches. This may add tension to your neck and cause strain.  Look at the ceiling. Tighten your belly muscles and lift your shoulders and upper back off the floor. Breathe out while you are doing this. Lower your shoulders to the floor. Breathe in while you are doing this. Relax your belly muscles all the way before starting another crunch.  Arm and leg lifts on hands and knees ? Start on your hands and knees. With all of these exercises, keep your back as level as possible. If you are having trouble with this, you may want to put a small object on your back such as a book. If it falls off, you are not keeping your back level enough during the exercise.  Lift one arm up to shoulder level and hold. Lower it back down. Now, lift up the other arm and hold.  Lift one leg up and kick it straight out until it is in line with your back and hold. Lower it back down. Now, lift up the other leg and hold.  Lift one arm and the OPPOSITE leg up at the same time and hold. Lower them down. Now, repeat using the other arm and leg. This is a very hard exercise. It may take time to be able to do this.               What will the results be?   Stronger core  Better balance  More toned belly and back muscles  Easier to do daily  activities  Better sports performance  Less low back pain  Helpful tips   Stay active and work out to keep your muscles strong and flexible.  Keep a healthy weight to avoid putting too much stress on your spine. Eat a healthy diet to keep your muscles healthy.  Be sure you do not hold your breath when exercising. This can raise your blood pressure. If you tend to hold your breath, try counting out loud when exercising. If any exercise bothers you, stop right away.  Try walking or cycling at an easy pace for a few minutes to warm up your muscles. Do this again after exercising.  Exercise may be slightly uncomfortable, but you should not have sharp pains. If you do get sharp pains, stop what you are doing. If the sharp pains continue, call your doctor.  Last Reviewed Date   2021-03-18  Consumer Information Use and Disclaimer   This generalized information is a limited summary of diagnosis, treatment, and/or medication information. It is not meant to be comprehensive and should be used as a tool to help the user understand and/or assess potential diagnostic and treatment options. It does NOT include all information about conditions, treatments, medications, side effects, or risks that may apply to a specific patient. It is not intended to be medical advice or a substitute for the medical advice, diagnosis, or treatment of a health care provider based on the health care provider's examination and assessment of a patient’s specific and unique circumstances. Patients must speak with a health care provider for complete information about their health, medical questions, and treatment options, including any risks or benefits regarding use of medications. This information does not endorse any treatments or medications as safe, effective, or approved for treating a specific patient. [employer] and its affiliates disclaim any warranty or liability relating to this information or the use thereof. The use of this information  is governed by the Terms of Use, available at https://www.woltersMy-wardrobe.comuwer.com/en/know/clinical-effectiveness-terms   Copyright   Copyright © 2024 [employer] and its affiliates and/or licensors. All rights reserved.

## 2024-11-18 NOTE — PROGRESS NOTES
Assessment:  1. Lumbar disc disease with radiculopathy    2. Right sided sciatica    3. Chronic pain syndrome    4. Right leg weakness    5. DM type 2, not at goal (HCC)        Plan:  Patient is a 73-year-old female complains of back pain and right leg pain with chronic pain syndrome secondary to lumbar radiculopathy presents to office for follow-up visit.  Patient reported some relief when taking Cymbalta 30 mg p.o. nightly.  MRI cervical spine shows mild right subarticular recess encroachment at L1-L2, mild right subarticular recess encroachment and spinal stenosis at L3-L4, L5-S1 mild disc bulge with right paracentral disc herniation extending into the right lateral recess impinging on the transversing right S1 nerve root with mild bilateral foraminal stenosis, L4-L5 right-sided foraminal narrowing.  Patient reports significant alleviation of symptoms with Cymbalta 30 mg p.o. nightly.  1.  We will provide patient physical therapy lumbar core strengthening exercise  2.  We will follow-up in 6 weeks      History of Present Illness:  The patient is a 73 y.o. female who presents for a follow up office visit in regards to Back Pain and Leg Pain (bilateral).   The patient’s current symptoms include 5 out of 10 intermittent dosaging, throbbing, shooting pain worse in morning time.    Current pain medications includes: Cymbalta.  The patient reports that this regimen is providing 60% pain relief.  The patient is reporting no side effects from this pain medication regimen.    I have personally reviewed and/or updated the patient's past medical history, past surgical history, family history, social history, current medications, allergies, and vital signs today.         Review of Systems  Review of Systems   Musculoskeletal:  Positive for back pain.   All other systems reviewed and are negative.          Past Medical History:   Diagnosis Date    Diabetes mellitus (HCC)        Past Surgical History:   Procedure Laterality  "Date     SECTION         History reviewed. No pertinent family history.    Social History     Occupational History    Not on file   Tobacco Use    Smoking status: Never     Passive exposure: Never    Smokeless tobacco: Never   Vaping Use    Vaping status: Never Used   Substance and Sexual Activity    Alcohol use: Never    Drug use: Never    Sexual activity: Not Currently         Current Outpatient Medications:     Aspirin Low Dose 81 MG EC tablet, TOME 1 TABLETA POR VIA ORAL TODOS LOS AHUJA FOR 90 DAYS, Disp: , Rfl:     atorvastatin (LIPITOR) 20 mg tablet, Take 20 mg by mouth daily, Disp: , Rfl:     B-D UF III MINI PEN NEEDLES 31G X 5 MM MISC, daily, Disp: , Rfl:     Blood Glucose Monitoring Suppl (Assure Pro Blood Glucose Meter) MICHAEL, OneTouch Ultra2 Meter, Disp: , Rfl:     DULoxetine (CYMBALTA) 20 mg capsule, TAKE 1 CAPSULE BY MOUTH EVERY DAY, Disp: 90 capsule, Rfl: 1    ferrous fumarate-b12-vitamic C-folic acid (FOLTRIN) capsule, Take 1 capsule by mouth daily before breakfast, Disp: , Rfl:     folic acid-pyridoxine-cyanocobalamin (Folbic) 2.5-25-2 mg, 1 tablet, Disp: , Rfl:     Insulin Syringe-Needle U-100 25G X 1\" 1 ML MISC, BD Insulin Syringe Ultra-Fine 1 mL 31 gauge x 5/16\"  USE TO INJECT INSULIN ONCE DAILY AS DIRECTED, Disp: , Rfl:     Lancets 33G MISC, OneTouch Delica Plus Lancet 33 gauge  USE THREE TIMES A DAY TO CHECK SUGARS, Disp: , Rfl:     Lantus SoloStar 100 units/mL SOPN, INJECT 12 UNITS ONCE A DAY, Disp: , Rfl:     lisinopril (ZESTRIL) 5 mg tablet, Take 5 mg by mouth daily, Disp: , Rfl:     metFORMIN (GLUCOPHAGE) 1000 MG tablet, Take 1,000 mg by mouth daily with breakfast, Disp: , Rfl:     Multiple Vitamin (multivitamin) capsule, Take 1 capsule by mouth daily, Disp: , Rfl:     OneTouch Ultra test strip, Use 1 each daily Test, Disp: , Rfl:     predniSONE 10 mg tablet, 4 x 3 days, 3 x 1, 2 x 1, 1 x 1 (Patient not taking: Reported on 10/7/2024), Disp: 18 tablet, Rfl: 0    No Known " Allergies    Physical Exam:    There were no vitals taken for this visit.    Constitutional:normal, well developed, well nourished, alert, in no distress and non-toxic and no overt pain behavior.  Eyes:anicteric  HEENT:grossly intact  Neck:supple, symmetric, trachea midline and no masses   Pulmonary:even and unlabored  Cardiovascular:No edema or pitting edema present  Skin:Normal without rashes or lesions and well hydrated  Psychiatric:Mood and affect appropriate  Neurologic:Cranial Nerves II-XII grossly intact  Musculoskeletal:antalgic    Imaging      Study Result    Narrative & Impression   MRI LUMBAR SPINE WITHOUT CONTRAST     INDICATION: M54.31: Sciatica, right side  M51.16: Intervertebral disc disorders with radiculopathy, lumbar region  R29.898: Other symptoms and signs involving the musculoskeletal system  G89.4: Chronic pain syndrome.   Right-sided sciatica     COMPARISON: X-ray of the lumbar spine and hip from October 7 and September 11, 2024.     TECHNIQUE:  Multiplanar, multisequence imaging of the lumbar spine was performed. .  Imaging performed on 1.5T MRI     IMAGE QUALITY:  Diagnostic     FINDINGS:     VERTEBRAL BODIES: Transitional lumbosacral anatomy. Partial lumbarization of the S1 segment. Rudimentary S1-S2 disc. Normal alignment of the lumbar spine.  No spondylolysis or spondylolisthesis. No scoliosis.  No compression fracture.    Mixed type I,   type II and type III Modic endplate changes at L5-S1. Mixed type I and type II Modic endplate changes at L4-L5. Mixed type I, type II and type III Modic endplate changes at L3-4.     SACRUM: Superior S1 endplate degenerative changes as described above. No evidence of insufficiency or stress fracture.     DISTAL CORD AND CONUS:  Normal size and signal within the distal cord and conus.     PARASPINAL SOFT TISSUES:  Paraspinal soft tissues are unremarkable.     LOWER THORACIC DISC SPACES: At T10-T11, disc height loss, Schmorl's node deformities, mild disc  bulging, right central to foraminal protrusion. Mild spinal stenosis. No foraminal stenosis.  Remaining lower thoracic disc spaces are unremarkable.     LUMBAR DISC SPACES: Scattered Schmorl's node deformities. Moderate to severe disc desiccation and height loss at L5-S1.     L1-L2: Mild disc bulging,, partially contained by the PLL. Mild right subarticular recess encroachment. No significant spinal canal or foraminal stenosis.     L2-L3: Mild no spinal canal or foraminal stenosis.     L3-L4: Mild disc bulging and right-sided disc height loss. Mild subarticular recess encroachment and and spinal stenosis. Patent neural foramina.     L4-L5: Disc bulge with a superimposed right foraminal and far lateral protrusion. Bilateral facet arthropathy. Mild right greater than left subarticular recess stenosis and right-sided foraminal narrowing. Mild spinal stenosis.     L5-S1: Notable disc height loss and desiccation with mild disc bulging and superimposed broad central and right greater than left central disc herniation with a small component extending into the right lateral recess. This is impinging on the traversing   right S1 nerve root. There is mild left-sided subarticular and lateral recess stenosis. Moderate spinal stenosis. Mild bilateral foraminal stenosis.     OTHER FINDINGS:  None.     IMPRESSION:     Transitional lumbosacral anatomy.     Multilevel degenerative changes as described above, most notably at L5-S1 where there is degenerative discogenic disease resulting in right-sided lateral recess stenosis and impinging on the traversing S1 nerve root. Correlate for ipsilateral S1   radiculitis.     Additional degenerative changes as described above.        Workstation performed: [id number]            No orders to display       No orders of the defined types were placed in this encounter.

## 2025-06-03 DIAGNOSIS — M51.16 LUMBAR DISC DISEASE WITH RADICULOPATHY: ICD-10-CM

## 2025-06-03 DIAGNOSIS — R29.898 RIGHT LEG WEAKNESS: ICD-10-CM

## 2025-06-03 DIAGNOSIS — M54.31 RIGHT SIDED SCIATICA: ICD-10-CM

## 2025-06-03 DIAGNOSIS — G89.4 CHRONIC PAIN SYNDROME: ICD-10-CM

## 2025-06-03 RX ORDER — DULOXETIN HYDROCHLORIDE 20 MG/1
20 CAPSULE, DELAYED RELEASE ORAL DAILY
Qty: 90 CAPSULE | Refills: 1 | Status: SHIPPED | OUTPATIENT
Start: 2025-06-03